# Patient Record
Sex: MALE | HISPANIC OR LATINO | Employment: OTHER | URBAN - METROPOLITAN AREA
[De-identification: names, ages, dates, MRNs, and addresses within clinical notes are randomized per-mention and may not be internally consistent; named-entity substitution may affect disease eponyms.]

---

## 2018-05-06 ENCOUNTER — HOSPITAL ENCOUNTER (EMERGENCY)
Facility: MEDICAL CENTER | Age: 58
End: 2018-05-06
Attending: EMERGENCY MEDICINE
Payer: COMMERCIAL

## 2018-05-06 VITALS
OXYGEN SATURATION: 96 % | HEIGHT: 72 IN | WEIGHT: 188 LBS | TEMPERATURE: 98.6 F | SYSTOLIC BLOOD PRESSURE: 136 MMHG | RESPIRATION RATE: 20 BRPM | BODY MASS INDEX: 25.47 KG/M2 | DIASTOLIC BLOOD PRESSURE: 81 MMHG | HEART RATE: 55 BPM

## 2018-05-06 DIAGNOSIS — S39.012A STRAIN OF LUMBAR REGION, INITIAL ENCOUNTER: ICD-10-CM

## 2018-05-06 PROCEDURE — 700102 HCHG RX REV CODE 250 W/ 637 OVERRIDE(OP): Performed by: EMERGENCY MEDICINE

## 2018-05-06 PROCEDURE — 96372 THER/PROPH/DIAG INJ SC/IM: CPT

## 2018-05-06 PROCEDURE — 700111 HCHG RX REV CODE 636 W/ 250 OVERRIDE (IP): Performed by: EMERGENCY MEDICINE

## 2018-05-06 PROCEDURE — 99284 EMERGENCY DEPT VISIT MOD MDM: CPT

## 2018-05-06 PROCEDURE — A9270 NON-COVERED ITEM OR SERVICE: HCPCS | Performed by: EMERGENCY MEDICINE

## 2018-05-06 RX ORDER — KETOROLAC TROMETHAMINE 30 MG/ML
INJECTION, SOLUTION INTRAMUSCULAR; INTRAVENOUS
Status: DISCONTINUED
Start: 2018-05-06 | End: 2018-05-06 | Stop reason: HOSPADM

## 2018-05-06 RX ORDER — CYCLOBENZAPRINE HCL 10 MG
5 TABLET ORAL ONCE
Status: COMPLETED | OUTPATIENT
Start: 2018-05-06 | End: 2018-05-06

## 2018-05-06 RX ORDER — CYCLOBENZAPRINE HCL 5 MG
5-10 TABLET ORAL 3 TIMES DAILY PRN
Qty: 30 TAB | Refills: 0 | Status: SHIPPED | OUTPATIENT
Start: 2018-05-06

## 2018-05-06 RX ORDER — IBUPROFEN 800 MG/1
800 TABLET ORAL EVERY 8 HOURS PRN
Qty: 30 TAB | Refills: 0 | Status: SHIPPED | OUTPATIENT
Start: 2018-05-06

## 2018-05-06 RX ORDER — KETOROLAC TROMETHAMINE 30 MG/ML
60 INJECTION, SOLUTION INTRAMUSCULAR; INTRAVENOUS ONCE
Status: COMPLETED | OUTPATIENT
Start: 2018-05-06 | End: 2018-05-06

## 2018-05-06 RX ADMIN — KETOROLAC TROMETHAMINE 60 MG: 30 INJECTION, SOLUTION INTRAMUSCULAR at 14:43

## 2018-05-06 RX ADMIN — CYCLOBENZAPRINE 5 MG: 10 TABLET, FILM COATED ORAL at 14:43

## 2018-05-06 ASSESSMENT — LIFESTYLE VARIABLES
TOTAL SCORE: 0
HOW MANY TIMES IN THE PAST YEAR HAVE YOU HAD 5 OR MORE DRINKS IN A DAY: 0
HAVE YOU EVER FELT YOU SHOULD CUT DOWN ON YOUR DRINKING: NO
TOTAL SCORE: 0
EVER HAD A DRINK FIRST THING IN THE MORNING TO STEADY YOUR NERVES TO GET RID OF A HANGOVER: NO
HAVE PEOPLE ANNOYED YOU BY CRITICIZING YOUR DRINKING: NO
ON A TYPICAL DAY WHEN YOU DRINK ALCOHOL HOW MANY DRINKS DO YOU HAVE: 2
CONSUMPTION TOTAL: NEGATIVE
DO YOU DRINK ALCOHOL: YES
EVER FELT BAD OR GUILTY ABOUT YOUR DRINKING: NO
TOTAL SCORE: 0
AVERAGE NUMBER OF DAYS PER WEEK YOU HAVE A DRINK CONTAINING ALCOHOL: 2

## 2018-05-06 ASSESSMENT — PAIN SCALES - GENERAL: PAINLEVEL_OUTOF10: 6

## 2018-05-06 NOTE — ED NOTES
Pt has been provided written and verbal education for back strain. He has been provided paper prescriptions and verbalized understanding to follow up with [facility] ASAP. He ambulated to lobby with steady gait.

## 2018-05-06 NOTE — ED TRIAGE NOTES
Pt to be triage via wheelchair, pt states he was injured at work while lifting asphalt into truck. Was seen at [facility] , told he was fine. C/o worsening pain in back , weakness in legs

## 2018-05-06 NOTE — ED PROVIDER NOTES
ED Provider Note    Scribed for Marcial Schwartz M.D. by Marilee Kohli. 5/6/2018  2:06 PM    Primary care provider: PCP, pt states none   Means of arrival: Walk-in  History obtained from: Patient  History limited by: None    CHIEF COMPLAINT  Chief Complaint   Patient presents with   • Low Back Pain       ELISA Song is a 57 y.o. male who presents to the Emergency Department for evaluation of lower back pain, onset one week ago (4/30) while lifting a heavy piece of asphalt. His pain worsens with leg movement but does not radiate into his legs. Subsequent imaging of his back at [facility] was unremarkable and he was referred to a chiropractor Friday, 5/4. His pain worsened the following Saturday and Sunday. Today, while trying to get up to go the bathroom, the patient fell down secondary to pain.     REVIEW OF SYSTEMS  Pertinent positives include lower back pain. E.     PAST MEDICAL HISTORY   has a past medical history of Vision decreased (since childhood).    SURGICAL HISTORY  patient denies any surgical history    SOCIAL HISTORY  Social History   Substance Use Topics   • Smoking status: Current Every Day Smoker     Packs/day: 0.50     Types: Cigarettes   • Smokeless tobacco: Never Used   • Alcohol use Yes      Comment: 1-2 beers per day      History   Drug Use No       FAMILY HISTORY  History reviewed. No pertinent family history.    CURRENT MEDICATIONS  Home Medications     Reviewed by Jennifer Morrow R.N. (Registered Nurse) on 05/06/18 at 1400  Med List Status: Complete   Medication Last Dose Status        Patient Dandre Taking any Medications                       ALLERGIES  No Known Allergies    PHYSICAL EXAM  VITAL SIGNS: /75   Pulse (!) 55   Temp 36.7 °C (98 °F)   Resp 16   Ht 1.829 m (6')   Wt 85.3 kg (188 lb)   SpO2 96%   BMI 25.50 kg/m²     Constitutional: Well developed, Well nourished, moderate distress, Non-toxic appearance.   HENT: Normocephalic, Atraumatic.  Oropharynx moist.    Eyes: PERRL, EOMI, Conjunctiva normal, No discharge.   CV: Good pulses  Thorax & Lungs: No respiratory distress.   Skin: Warm, Dry, No erythema, No rash.    Musculoskeletal: Diffuse gluteal and paraspinal muscular tenderness  Neurologic: Awake, alert. Moves all extremities spontaneously.  Psychiatric: Affect normal, Mood normal.      COURSE & MEDICAL DECISION MAKING  Nursing notes, VS, PMSFHx reviewed in chart.    2:06 PM - Patient seen and examined at bedside. Patient will be treated with Toradol 60 mg, Flexeril 5 mg, Ketorolac Tromethamine 30 mg/mL. Strongly recommended maintaining gentle activity to relax tense muscles. Instructed to follow-up with [facility].     3:24 PM - Patient walking around room. Reports improved pain. The patient will be discharged with Motrin and Flexeril and should return if symptoms worsen or if new symptoms arise. The patient understands and agrees to plan.     Decision Making:  Acute low back pain, likely muscular strain, x-rays of artery been done as an outpatient, I do not believe the patient needs any further imaging studies, give the patient an IM shot of Toradol, will get the patient a prescription for Flexeril and ibuprofen, have the patient follow up with Worker's Comp. clinic, have the patient return with any other concerns.    Patient's blood pressure was elevated, I believe it is likely secondary to medical condition. However I have advised home monitoring and if it continues to be 120/80 or higher, advised to followup with primary care physician for blood pressure management.        DISPOSITION:  Patient will be discharged home in stable condition.    FOLLOW UP:  [facility], Emergency Dept  [address] [address] [phone]    If symptoms worsen    [facility]  [address] [place] [id number]  [phone]        OUTPATIENT MEDICATIONS:  New Prescriptions    CYCLOBENZAPRINE (FLEXERIL) 5 MG TABLET    Take 1-2 Tabs by mouth 3  times a day as needed.    IBUPROFEN (MOTRIN) 800 MG TAB    Take 1 Tab by mouth every 8 hours as needed (pain).       FINAL IMPRESSION  1. Strain of lumbar region, initial encounter          IMarilee (Scribe), am scribing for, and in the presence of, Marcial Schwartz M.D..    Electronically signed by: Marilee Kohli (Scribe), 5/6/2018    IMarcial M.D. personally performed the services described in this documentation, as scribed by Marilee Kohli in my presence, and it is both accurate and complete.    The note accurately reflects work and decisions made by me.  Marcial Schwartz  5/6/2018  4:29 PM

## 2018-05-06 NOTE — DISCHARGE INSTRUCTIONS
Please follow-up with your primary care provider for blood pressure management.      Distensión lumbar con rehabilitación  (Low Back Strain With Rehab)  Jb distensión es un estiramiento o un desgarro en un músculo o los samira cordones de tejido que adhieren el músculo al hueso (tendones). Las distensiones en la parte inferior de la espalda (columna lumbar) son jb causa frecuente de dolor lumbar. Jb distensión ocurre cuando los músculos o tendones se desgarran o se estiran más allá de gibson límite. Los músculos se pueden inflamar y, por lo tanto, provocar dolor y contracción repentina del músculo (espasmos). Jb distensión puede ocurrir de repente debido a jb lesión (traumatismo) o se puede desarrollar gradualmente debido al uso excesivo.  Hay eula tipos de distensiones:  · El tereza 1 es jb distensión leve que se caracteriza por un desgarro uma de las fibras o tendones musculares. [place] puede provocar un poco de dolor, fernando no hay pérdida de fuerza muscular.  · El tereza 2 es jb distensión moderada producida por un desgarro parcial de las fibras o tendones musculares. [place] provoca un dolor más intenso y cierta pérdida de fuerza muscular.  · El tereza 3 es jb distensión grave e implica la ruptura completa del músculo o del tendón. [place] causa un dolor intenso y la pérdida completa o wilma completa de la fuerza muscular.  CAUSAS  Esta afección puede ser causada por lo siguiente:  · Traumatismo, debido, por ejemplo, a jb caída o a un golpe en el cuerpo.  · Torsión o hiperdistensión de la espalda. [place] puede ser el resultado de realizar actividades que requieren mucha energía, lucia levantar objetos pesados.  FACTORES DE RIESGO  Los siguientes factores pueden aumentar el riesgo de sufrir esta afección:  · Practicar deportes de contacto.  · Participar en deportes o actividades que sobrecargan la espalda e implican mucha flexión y torsión, por ejemplo:  ¨ Levantar pesas u objetos  pesados.  ¨ Gimnasia.  ¨ Fútbol.  ¨ Patinaje artístico.  ¨ Snowboard.  · Tener exceso de peso u obesidad.  · Tener poca fuerza y flexibilidad.  SÍNTOMAS  Los síntomas de esta afección pueden incluir los siguientes:  · Dolor mitchel o sordo en la parte inferior de la espalda que no desaparece. El dolor se puede extender hacia las nalgas.  · Rigidez.  · Amplitud de movimientos limitada.  · Incapacidad para pararse derecho debido a la rigidez o al dolor.  · Espasmos musculares.  DIAGNÓSTICO  Esta afección se puede diagnosticar en función de lo siguiente:  · Elana síntomas.  · Elana antecedentes médicos.  · Un examen físico.  ¨ El médico puede presionar sobre ciertas zonas de la espalda para determinar el origen de gibson dolor.  ¨ Le puede pedir que se incline hacia adelante, hacia atrás y de un lado al otro para evaluar la intensidad del dolor y la amplitud de movimiento.  · Pruebas de diagnóstico por imágenes, lucia:  ¨ Radiografías.  ¨ Resonancia magnética (RM).  TRATAMIENTO  El tratamiento de esta afección puede incluir lo siguiente:  · Aplicar calor y frío en la paco afectada.  · Gwendolyn medicamentos para aliviar el dolor y relajar los músculos (relajantes musculares).  · Gwendolyn antiinflamatorios no esteroides (TOBY) para ayudar a reducir la hinchazón y las molestias.  · Realizar fisioterapia.  Cuando los síntomas mejoran, es importante volver a gibson rutina habitual kwok pronto lucia sea posible para reducir el dolor, y evitar la rigidez y la pérdida de fuerza muscular. En general, los síntomas deberían mejorar en 6 semanas de tratamiento. Sin embargo, el tiempo de recuperación varía.  INSTRUCCIONES PARA EL CUIDADO EN EL HOGAR  Control del dolor, de la rigidez y de la hinchazón   · Si se lo indicó el médico, aplique hielo en la paco afectada orin las primeras 24 horas después de la lesión.  ¨ Ponga el hielo en jb bolsa plástica.  ¨ Coloque jb toalla entre la piel y la bolsa de hielo.  ¨ Coloque el hielo orin 20 minutos, 2 a  3 veces por día.  · Si se lo indican, aplique calor en la paco afectada tan frecuentemente lucia se lo haya indicado el médico. Use la phi de calor que el médico le recomiende, lucia jb compresa de calor húmedo o jb almohadilla térmica.  ¨ Coloque jb toalla entre la piel y la phi de calor.  ¨ Aplique el calor orin 20 a 30 minutos.  ¨ Retire la phi de calor si la piel se le pone de color allison brillante. [place] es muy importante si no puede sentir el dolor, el calor o el frío. Puede correr un riesgo mayor de sufrir quemaduras.  Actividad   · Descanse y retome jenniffer actividades normales lucia se lo haya indicado el médico. Pregúntele al médico qué actividades son seguras para usted.  · Evite las actividades que demandan mucho esfuerzo (que son extenuantes) orin el tiempo que le haya indicado el médico.  · Rut ejercicios lucia se lo haya indicado el médico.  Instrucciones generales   · [place]s los medicamentos de venta hollis y los recetados solamente lucia se lo haya indicado el médico.  · Si tiene alguna pregunta o inquietud sobre la seguridad mientras francisco analgésicos, hable con el médico.  · No conduzca ni opere maquinaria pesada hasta saber cómo lo afectan los analgésicos.  · No consuma ningún producto que contenga tabaco, lo que incluye cigarrillos, tabaco de mascar y cigarrillos electrónicos. El tabaco puede retrasar el proceso de curación. Si necesita ayuda para dejar de fumar, consulte al médico.  · Concurra a todas las visitas de control lucia se lo haya indicado el médico. [place] es importante.  PREVENCIÓN  · Precaliente y elongue adecuadamente antes de la actividad.  · Relájese y elongue después de realizar jb actividad.  · Mario a gibson cuerpo tiempo para descansar entre los períodos de actividad.  · Evite lo siguiente:  ¨ Estar físicamente inactivo orin períodos prolongados.  ¨ Hacer ejercicio o practicar deportes cuando está cansado o dolorido.  · Practique deportes y levante objetos pesados de la  forma correcta.  · Adopte jb buena postura mientras esté sentado y de pie.  · Mantenga un peso saludable.  · Duerma en un colchón de firmeza media para apoyar la columna.  · Asegúrese de utilizar un equipo apto para usted, incluidos zapatos que calcen ronald.  · Champ medidas de seguridad y sea responsable al hacer jb actividad, para evitar las caídas.  · Rut por lo menos 150 minutos de ejercicios de intensidad moderada cada semana, lucia caminar a paso ligero o hacer gimnasia acuática. Pruebe alguna forma de ejercicio que le quite tensión de la espalda, lucia nadar o utilizar la bicicleta fija.  · Mantenga un buen estado físico, esto incluye lo siguiente:  ¨ La fuerza.  ¨ La flexibilidad.  ¨ La capacidad cardiovascular.  ¨ La resistencia.  SOLICITE ATENCIÓN MÉDICA SI:  · El dolor de espalda no mejora después de 6 semanas de tratamiento.  · Los síntomas empeoran.  SOLICITE ATENCIÓN MÉDICA DE INMEDIATO SI:  · El dolor de espalda es muy intenso.  · Nota que no puede pararse o caminar.  · Siente dolor en las piernas.  · Siente debilidad en las nalgas o en las piernas.  · Tiene problemas para controlar la micción o los movimientos intestinales.  Esta información no tiene lucia fin reemplazar el consejo del médico. Asegúrese de hacerle al médico cualquier pregunta que tenga.    Please follow-up with your primary care provider for blood pressure management.  Por favor, rut un seguimiento con gibson proveedor de atención primaria para la gestión de la presión arterial.       Document Released: 10/04/2007 Document Revised: 05/03/2016 Document Reviewed: 09/28/2016  Elsevier Interactive Patient Education © 2017 Elsevier Inc[employer]

## 2018-05-06 NOTE — LETTER
[facility], EMERGENCY DEPT   [address], [place] [address]  Phone: Dept: [phone] - Fax:        [facility] Progress Report and Disability Certification  Date of Service: 5/6/2018   No Show:  No  Date / Time of Next Visit:     Claim Information   Patient Name: Binh Song  Claim Number:     Employer: [employer] Date of Injury: 4/30/2018     Insurer / TPA:  [employer] ID / SSN:    Occupation: [employer] Diagnosis: The encounter diagnosis was Strain of lumbar region, initial encounter.    Medical Information   Related to Industrial Injury?   ***   Subjective Complaints:  Low back pain   Objective Findings: Muscle spasm and low back strain   Pre-Existing Condition(s):     Assessment:   Initial Visit    Status: Additional Care Required  Permanent Disability:     Plan: Medication    Diagnostics:      Comments:       Disability Information   Status:      From:     Through:   Restrictions are:     Physical Restrictions   Sitting:    Standing:    Stooping:    Bending:      Squatting:    Walking:    Climbing:    Pushing:      Pulling:    Other:    Reaching Above Shoulder (L):   Reaching Above Shoulder (R):       Reaching Below Shoulder (L):    Reaching Below Shoulder (R):      Not to exceed Weight Limits   Carrying(hrs):   Weight Limit(lb):   Lifting(hrs):   Weight  Limit(lb):     Comments:      Repetitive Actions   Hands: i.e. Fine Manipulations from Grasping:     Feet: i.e. Operating Foot Controls:     Driving / Operate Machinery:     Physician Name: Marcial Schwartz Physician Signature: MARCIAL Sanders M.D. e-Signature:  , Medical Director   Clinic Name / Location: [facility], EMERGENCY DEPT  [address] [facility] [address]  [phone]     Clinic Phone Number: Dept: [phone]   Appointment Time:  Visit Start Time:    Check-In Time:  12:32 PM Visit Discharge Time:       Original-Treating Physician or Chiropractor    Page 2-Insurer/TPA    Page 3-Employer    Page 4-Employee

## 2022-07-01 ENCOUNTER — OFFICE VISIT (OUTPATIENT)
Dept: URGENT CARE | Facility: PHYSICIAN GROUP | Age: 62
End: 2022-07-01

## 2022-07-01 VITALS
DIASTOLIC BLOOD PRESSURE: 76 MMHG | HEIGHT: 71 IN | SYSTOLIC BLOOD PRESSURE: 120 MMHG | TEMPERATURE: 96.4 F | WEIGHT: 170 LBS | RESPIRATION RATE: 18 BRPM | OXYGEN SATURATION: 99 % | BODY MASS INDEX: 23.8 KG/M2 | HEART RATE: 69 BPM

## 2022-07-01 DIAGNOSIS — L30.9 DERMATITIS: ICD-10-CM

## 2022-07-01 DIAGNOSIS — L08.9 SOFT TISSUE INFECTION: ICD-10-CM

## 2022-07-01 PROCEDURE — 99203 OFFICE O/P NEW LOW 30 MIN: CPT | Performed by: PHYSICIAN ASSISTANT

## 2022-07-01 RX ORDER — AMOXICILLIN 500 MG/1
500 CAPSULE ORAL 3 TIMES DAILY
Qty: 15 CAPSULE | Refills: 0 | Status: SHIPPED | OUTPATIENT
Start: 2022-07-01 | End: 2022-07-06

## 2022-07-01 RX ORDER — TRIAMCINOLONE ACETONIDE 1 MG/G
1 CREAM TOPICAL 2 TIMES DAILY
Qty: 80 G | Refills: 0 | Status: SHIPPED | OUTPATIENT
Start: 2022-07-01 | End: 2022-07-08

## 2022-07-01 NOTE — PROGRESS NOTES
"Subjective:   Binh Song is a 61 y.o. male who presents for Rash (Rash on leg has had it for approximately 1 year, started off small rash has expanded within the year. )        Patient presents with concerns of rash that has been on his leg for the last year.  The rash has become more itchy lately and he has been frequently scratching the area.  He scratched open some of the skin and states that this area has not become painful.  He describes the pain as \"burning.  He denies rashes on other parts of his body.  Denies nausea, vomiting, fevers, chills.  He has tried applying all of oil compresses without significant symptomatic relief.    Review of Systems   Skin: Positive for itching and rash.       PMH:  has a past medical history of Vision decreased (since childhood).  MEDS:   Current Outpatient Medications:   •  amoxicillin (AMOXIL) 500 MG Cap, Take 1 Capsule by mouth 3 times a day for 5 days., Disp: 15 Capsule, Rfl: 0  •  triamcinolone acetonide (KENALOG) 0.1 % Cream, Apply 1 g topically 2 times a day for 7 days., Disp: 80 g, Rfl: 0  •  ibuprofen (MOTRIN) 800 MG Tab, Take 1 Tab by mouth every 8 hours as needed (pain). (Patient not taking: Reported on 7/1/2022), Disp: 30 Tab, Rfl: 0  •  cyclobenzaprine (FLEXERIL) 5 MG tablet, Take 1-2 Tabs by mouth 3 times a day as needed. (Patient not taking: Reported on 7/1/2022), Disp: 30 Tab, Rfl: 0  ALLERGIES: No Known Allergies  SURGHX: No past surgical history on file.  SOCHX:  reports that he has been smoking cigarettes. He has been smoking about 0.50 packs per day. He has never used smokeless tobacco. He reports current alcohol use. He reports that he does not use drugs.  FH: Family history was reviewed, no pertinent findings to report   Objective:   /76   Pulse 69   Temp (!) 35.8 °C (96.4 °F) (Tympanic)   Resp 18   Ht 1.803 m (5' 11\")   Wt 77.1 kg (170 lb)   SpO2 99%   BMI 23.71 kg/m²   Physical Exam  Vitals reviewed.   Constitutional:       General: He is " not in acute distress.     Appearance: Normal appearance. He is well-developed. He is not toxic-appearing.   HENT:      Head: Normocephalic and atraumatic.      Right Ear: External ear normal.      Left Ear: External ear normal.      Nose: Nose normal.   Cardiovascular:      Rate and Rhythm: Normal rate and regular rhythm.   Pulmonary:      Effort: Pulmonary effort is normal. No respiratory distress.      Breath sounds: No stridor.   Skin:     General: Skin is dry.             Comments: Mildly erythematous macular papular rash with excoriations on left lateral lower leg.  There are several small open abrasions where the skin has been scratched open.  1 abrasion is tender to touch with 1 cm and a half of surrounding erythema mild edema.  Mild warmth.  No discharge.  No vesicles.   Neurological:      Comments: Alert and oriented.    Psychiatric:         Speech: Speech normal.         Behavior: Behavior normal.           Assessment/Plan:   1. Soft tissue infection  - amoxicillin (AMOXIL) 500 MG Cap; Take 1 Capsule by mouth 3 times a day for 5 days.  Dispense: 15 Capsule; Refill: 0    2. Dermatitis  - triamcinolone acetonide (KENALOG) 0.1 % Cream; Apply 1 g topically 2 times a day for 7 days.  Dispense: 80 g; Refill: 0    Does not look herpetic.  This does not look fungal, but consideration discussed.  This looks like a dermatitis with localized concomitant soft tissue infection.  Patient started on a short course of amoxicillin and topical triamcinolone.  If area of pain and redness fails to improve within 48 hours, new symptoms develop at any point or symptoms worsen return to clinic for reevaluation.  Additionally if rash/itching fails to improve within 5 to 7 days I would also like patient to be reevaluated.  All questions and concerns addressed.  Patient comfortable with treatment plan and return precautions.  Differential diagnosis, natural history, supportive care, and indications for immediate follow-up  discussed.